# Patient Record
Sex: FEMALE | Race: WHITE | NOT HISPANIC OR LATINO | URBAN - METROPOLITAN AREA
[De-identification: names, ages, dates, MRNs, and addresses within clinical notes are randomized per-mention and may not be internally consistent; named-entity substitution may affect disease eponyms.]

---

## 2023-06-06 ENCOUNTER — EMERGENCY (EMERGENCY)
Facility: HOSPITAL | Age: 73
LOS: 1 days | Discharge: HOME | End: 2023-06-06
Attending: STUDENT IN AN ORGANIZED HEALTH CARE EDUCATION/TRAINING PROGRAM
Payer: MEDICARE

## 2023-06-06 VITALS
OXYGEN SATURATION: 96 % | TEMPERATURE: 98 F | HEART RATE: 80 BPM | SYSTOLIC BLOOD PRESSURE: 136 MMHG | DIASTOLIC BLOOD PRESSURE: 80 MMHG | RESPIRATION RATE: 16 BRPM | WEIGHT: 102.07 LBS | HEIGHT: 63 IN

## 2023-06-06 LAB
ALBUMIN SERPL ELPH-MCNC: 3.8 G/DL — SIGNIFICANT CHANGE UP (ref 3.3–5)
ALP SERPL-CCNC: 67 U/L — SIGNIFICANT CHANGE UP (ref 40–120)
ALT FLD-CCNC: 20 U/L — SIGNIFICANT CHANGE UP (ref 10–45)
ANION GAP SERPL CALC-SCNC: 11 MMOL/L — SIGNIFICANT CHANGE UP (ref 5–17)
APPEARANCE UR: ABNORMAL
AST SERPL-CCNC: 28 U/L — SIGNIFICANT CHANGE UP (ref 10–40)
BACTERIA # UR AUTO: NEGATIVE — SIGNIFICANT CHANGE UP
BASOPHILS # BLD AUTO: 0.03 K/UL — SIGNIFICANT CHANGE UP (ref 0–0.2)
BASOPHILS NFR BLD AUTO: 0.3 % — SIGNIFICANT CHANGE UP (ref 0–2)
BILIRUB SERPL-MCNC: 0.5 MG/DL — SIGNIFICANT CHANGE UP (ref 0.2–1.2)
BILIRUB UR-MCNC: NEGATIVE — SIGNIFICANT CHANGE UP
BUN SERPL-MCNC: 14 MG/DL — SIGNIFICANT CHANGE UP (ref 7–23)
CALCIUM SERPL-MCNC: 8.9 MG/DL — SIGNIFICANT CHANGE UP (ref 8.4–10.5)
CHLORIDE SERPL-SCNC: 101 MMOL/L — SIGNIFICANT CHANGE UP (ref 96–108)
CO2 SERPL-SCNC: 26 MMOL/L — SIGNIFICANT CHANGE UP (ref 22–31)
COLOR SPEC: YELLOW — SIGNIFICANT CHANGE UP
CREAT SERPL-MCNC: 0.52 MG/DL — SIGNIFICANT CHANGE UP (ref 0.5–1.3)
DIFF PNL FLD: ABNORMAL
EGFR: 99 ML/MIN/1.73M2 — SIGNIFICANT CHANGE UP
EOSINOPHIL # BLD AUTO: 0.02 K/UL — SIGNIFICANT CHANGE UP (ref 0–0.5)
EOSINOPHIL NFR BLD AUTO: 0.2 % — SIGNIFICANT CHANGE UP (ref 0–6)
EPI CELLS # UR: 1 /HPF — SIGNIFICANT CHANGE UP
ERYTHROCYTE [SEDIMENTATION RATE] IN BLOOD: 22 MM/HR — HIGH (ref 0–20)
GLUCOSE SERPL-MCNC: 95 MG/DL — SIGNIFICANT CHANGE UP (ref 70–99)
GLUCOSE UR QL: NEGATIVE — SIGNIFICANT CHANGE UP
HCT VFR BLD CALC: 41.6 % — SIGNIFICANT CHANGE UP (ref 34.5–45)
HGB BLD-MCNC: 13.9 G/DL — SIGNIFICANT CHANGE UP (ref 11.5–15.5)
HYALINE CASTS # UR AUTO: 6 /LPF — HIGH (ref 0–2)
IMM GRANULOCYTES NFR BLD AUTO: 0.6 % — SIGNIFICANT CHANGE UP (ref 0–0.9)
KETONES UR-MCNC: SIGNIFICANT CHANGE UP
LEUKOCYTE ESTERASE UR-ACNC: ABNORMAL
LYMPHOCYTES # BLD AUTO: 1.03 K/UL — SIGNIFICANT CHANGE UP (ref 1–3.3)
LYMPHOCYTES # BLD AUTO: 8.7 % — LOW (ref 13–44)
MCHC RBC-ENTMCNC: 29.6 PG — SIGNIFICANT CHANGE UP (ref 27–34)
MCHC RBC-ENTMCNC: 33.4 GM/DL — SIGNIFICANT CHANGE UP (ref 32–36)
MCV RBC AUTO: 88.7 FL — SIGNIFICANT CHANGE UP (ref 80–100)
MONOCYTES # BLD AUTO: 0.93 K/UL — HIGH (ref 0–0.9)
MONOCYTES NFR BLD AUTO: 7.9 % — SIGNIFICANT CHANGE UP (ref 2–14)
NEUTROPHILS # BLD AUTO: 9.71 K/UL — HIGH (ref 1.8–7.4)
NEUTROPHILS NFR BLD AUTO: 82.3 % — HIGH (ref 43–77)
NITRITE UR-MCNC: NEGATIVE — SIGNIFICANT CHANGE UP
NRBC # BLD: 0 /100 WBCS — SIGNIFICANT CHANGE UP (ref 0–0)
PH UR: 6 — SIGNIFICANT CHANGE UP (ref 5–8)
PLATELET # BLD AUTO: 324 K/UL — SIGNIFICANT CHANGE UP (ref 150–400)
POTASSIUM SERPL-MCNC: 3.9 MMOL/L — SIGNIFICANT CHANGE UP (ref 3.5–5.3)
POTASSIUM SERPL-SCNC: 3.9 MMOL/L — SIGNIFICANT CHANGE UP (ref 3.5–5.3)
PROT SERPL-MCNC: 7.3 G/DL — SIGNIFICANT CHANGE UP (ref 6–8.3)
PROT UR-MCNC: ABNORMAL
RAPID RVP RESULT: SIGNIFICANT CHANGE UP
RBC # BLD: 4.69 M/UL — SIGNIFICANT CHANGE UP (ref 3.8–5.2)
RBC # FLD: 12.7 % — SIGNIFICANT CHANGE UP (ref 10.3–14.5)
RBC CASTS # UR COMP ASSIST: 2 /HPF — SIGNIFICANT CHANGE UP (ref 0–4)
SARS-COV-2 RNA SPEC QL NAA+PROBE: SIGNIFICANT CHANGE UP
SODIUM SERPL-SCNC: 138 MMOL/L — SIGNIFICANT CHANGE UP (ref 135–145)
SP GR SPEC: 1.03 — HIGH (ref 1.01–1.02)
UROBILINOGEN FLD QL: ABNORMAL
WBC # BLD: 11.79 K/UL — HIGH (ref 3.8–10.5)
WBC # FLD AUTO: 11.79 K/UL — HIGH (ref 3.8–10.5)
WBC UR QL: 367 /HPF — HIGH (ref 0–5)

## 2023-06-06 PROCEDURE — 73110 X-RAY EXAM OF WRIST: CPT | Mod: 26,RT

## 2023-06-06 PROCEDURE — 99223 1ST HOSP IP/OBS HIGH 75: CPT

## 2023-06-06 PROCEDURE — 73201 CT UPPER EXTREMITY W/DYE: CPT | Mod: 26,RT,MA

## 2023-06-06 PROCEDURE — 71046 X-RAY EXAM CHEST 2 VIEWS: CPT | Mod: 26

## 2023-06-06 PROCEDURE — 73090 X-RAY EXAM OF FOREARM: CPT | Mod: 26,LT

## 2023-06-06 RX ORDER — IBUPROFEN 200 MG
600 TABLET ORAL ONCE
Refills: 0 | Status: COMPLETED | OUTPATIENT
Start: 2023-06-06 | End: 2023-06-06

## 2023-06-06 RX ORDER — CEFTRIAXONE 500 MG/1
2000 INJECTION, POWDER, FOR SOLUTION INTRAMUSCULAR; INTRAVENOUS ONCE
Refills: 0 | Status: COMPLETED | OUTPATIENT
Start: 2023-06-06 | End: 2023-06-06

## 2023-06-06 RX ORDER — VANCOMYCIN HCL 1 G
1000 VIAL (EA) INTRAVENOUS ONCE
Refills: 0 | Status: COMPLETED | OUTPATIENT
Start: 2023-06-06 | End: 2023-06-06

## 2023-06-06 RX ORDER — ACETAMINOPHEN 500 MG
975 TABLET ORAL ONCE
Refills: 0 | Status: COMPLETED | OUTPATIENT
Start: 2023-06-06 | End: 2023-06-06

## 2023-06-06 RX ORDER — SODIUM CHLORIDE 9 MG/ML
1000 INJECTION INTRAMUSCULAR; INTRAVENOUS; SUBCUTANEOUS ONCE
Refills: 0 | Status: COMPLETED | OUTPATIENT
Start: 2023-06-06 | End: 2023-06-06

## 2023-06-06 RX ORDER — VENLAFAXINE HCL 75 MG
50 CAPSULE, EXT RELEASE 24 HR ORAL DAILY
Refills: 0 | Status: DISCONTINUED | OUTPATIENT
Start: 2023-06-06 | End: 2023-06-09

## 2023-06-06 RX ADMIN — Medication 250 MILLIGRAM(S): at 15:35

## 2023-06-06 RX ADMIN — CEFTRIAXONE 100 MILLIGRAM(S): 500 INJECTION, POWDER, FOR SOLUTION INTRAMUSCULAR; INTRAVENOUS at 15:35

## 2023-06-06 RX ADMIN — Medication 600 MILLIGRAM(S): at 17:18

## 2023-06-06 RX ADMIN — Medication 975 MILLIGRAM(S): at 15:58

## 2023-06-06 RX ADMIN — Medication 600 MILLIGRAM(S): at 12:23

## 2023-06-06 RX ADMIN — Medication 975 MILLIGRAM(S): at 11:00

## 2023-06-06 RX ADMIN — SODIUM CHLORIDE 1000 MILLILITER(S): 9 INJECTION INTRAMUSCULAR; INTRAVENOUS; SUBCUTANEOUS at 11:39

## 2023-06-06 NOTE — ED CDU PROVIDER INITIAL DAY NOTE - OBJECTIVE STATEMENT
73 yo F with a PMH of OA p/w multiple complaints, states last week she started experiencing non prod cough/sore throat. Went to  and tested pos for strep. Was started on Amox which she has been taking and benzoate for cough. Tested negative on rapid COVID/flu. Over the last week pt endorsing decreased appetite intake and generalized weakness/malaise. Also c/o R wrist pain, swelling and redness. States she was at a concert over the weekend and was clapping a lot, thinks this may have triggered her arthritis. No reported fever/chills. Pt endorsing urinary freq as well for last few days. No other urinary complaints. Denies nausea, vomiting, congestion/runny nose, chest pain, SOB, abd pain, diarrhea, headache, dizziness.    In ED wbc 11.79K. [id number]. UA +for infection; Xray wrist noted osteoarthritic changes (questioned scaphoid fracture though per ED team not concerned regarding this, no tenderness over area and nontraumatic). Xray forearm negative for fracture. ED team initially concerned for septic joint so tap was performed w/ scant return, sample sent to lab. Ortho consulted, pt sent to CDU for monitoring of symptoms, f/u tap results, and ortho consult.

## 2023-06-06 NOTE — ED PROVIDER NOTE - CROS ED CONS ALL NEG
Operative Report    Date: 11/10/2017    Surgeon: John Ganser M.D.    Assistant: Luis Eduardo Sihne PA-C    Anesthesia: Dr. Gansert    Preoperative Diagnosis: Morbid Obesity, Hypertension    Postoperative Diagnosis: Same, Hiatal Hernia    Procedure Performed: Laparoscopic Vertical Sleeve Gastrectomy, Hiatal hernia repair    Indications: The patient is suffering from morbid obesity and it's sequelae with a body mass index of 37 kg/m2. They have failed dietary attempts at weight loss and have been fully counseled about risks and alternatives to sleeve gastrectomy and wish to proceed.    Findings: Sliding hiatal hernia    DESCRIPTION OF PROCEDURE: The patient was identified and general anesthetic   administered. Her abdomen was prepped and draped in the usual sterile   fashion. Local anesthesia of 0.5% Marcaine with epinephrine was injected   prior to making skin incision. Small incision was made to left midline in low  epigastric region and the Veress needle passed. The abdomen was insufflated   with carbon dioxide without incident and a 5-mm blunt trocar and 5-mm   30-degree scope inserted. Carmela liver retractor was passed through a   small subxiphoid incision, used to elevate the lateral segment of liver and   this was held with the robotic arm. Right upper quadrant 12 mm, left upper   quadrant 15 mm, left lateral subcostal 5 mm trocars were placed. Inspection   of the hiatus revealed a sliding hiatal hernia. The fat pad was rotated off the gastroesophageal junction to help expose this area as well. The omentum was then   taken down off the greater curve of the stomach using the Harmonic scalpel.    The hiatal hernia was reduced and the hiatal hernia repaired with 0-Ethibond anteriorly.    A 40-Maltese bougie wasthen passed by anesthesia and laid along the lesser curve of the stomach. The [place] 60 power stapler was then placed starting about 4-5 cm proximal to the pylorus and positioned adjacent to the bougie and  negative... fired. The sleeve was   completed with sequential firing of the stapler using green and gold loads. A  small cuff of stomach was left adjacent to the esophagus. The distal stomach was removed through the 15 mm trocar site.    The staple line was then oversewn with 3-0 Vicryl suture   incorporating the omentum along the way. The bougie was removed and the   sleeve maintained good orientation with no torsion or kinks. The abdomen was   irrigated and hemostasis assured. The trocars were removed. The abdomen   deflated, and incisions closed with Vicryl. Sterile dressings were applied.   The patient returned to recovery room in stable condition.    ____________________________________  JOHN H. GANSER, MD John Ganser, M.D., F.A.C.S.  [facility]  [facility] [place]  [phone]

## 2023-06-06 NOTE — ED PROVIDER NOTE - ATTENDING APP SHARED VISIT CONTRIBUTION OF CARE
I, Jorge Luis Duong, performed a history and physical exam of the patient and discussed their management with the resident and/or advanced care provider. I reviewed the resident and/or advanced care provider's note and agree with the documented findings and plan of care. I was present and available for all procedures.    71 yo F with a PMH of OA p/w multiple complaints, states last week she started experiencing non prod cough/sore throat. Went to  and tested pos for strep. Was started on Amox which she has been taking and benzoate for cough. Tested negative on rapid COVID/flu. Over the last week pt endorsing decreased appetite intake and generalized weakness/malaise. Also c/o R wrist pain, swelling and redness. States she was at a concert over the weekend and was clapping a lot, thinks this may have triggered her arthritis. No reported fever/chills. Pt endorsing urinary freq as well for last few days. No other urinary complaints. Denies nausea, vomiting, congestion/runny nose, chest pain, SOB, abd pain, diarrhea, headache, dizziness. denies purposeless movements, denies rashes, denies Subcutaneous nodules    Well appearing and in NAD, head normal appearing atraumatic, trachea midline, no respiratory distress, lungs cta bilaterally, rrr no murmurs, soft NT ND abdomen, Right upper extremity mildly limited range of motion but overall able to range wrist slightly with some mild tenderness palpation at the distal forearm region in the proximal hand some red streaking underlying the dorsal aspect of the wrist without diffuse erythema or induration or warmth of the wrist, Neurovascular intact distally otherwise no visible extremity deformities , Alert and oriented, non focal neuro exam, skin warm and dry, normal affect and mood, no leg swelling, calf ttp or jvd    Concerning for arthritis likely inflammatory in nature consider reactive poststreptococcal arthritis in the setting of recent strep diagnosis and no other major joints criteria for rheumatic fever otherwise nonmigratory single joint no other rashes or nodules visualize unlikely septic arthritis possibly related to trauma will evaluate with x-ray rule out bony injury but no specific trauma patient does endorse clapping a lot on Saturday but otherwise woke up with pain on Sunday so may be overuse did have a fall 3 weeks ago sustained right wrist trauma but otherwise no evaluation for the fall discussed with patient extensively and daughter at bedside screening blood work inflammatory markers x-ray pain medication anticipate discharge with strict return precautions patient agreeable with plan

## 2023-06-06 NOTE — ED CDU PROVIDER INITIAL DAY NOTE - ATTENDING APP SHARED VISIT CONTRIBUTION OF CARE
I, Jorge Luis Duong, performed a history and physical exam of the patient and discussed their management with the resident and/or advanced care provider. I reviewed the resident and/or advanced care provider's note and agree with the documented findings and plan of care. I was present and available for all procedures.    73 yo F with a PMH of OA p/w multiple complaints, states last week she started experiencing non prod cough/sore throat. Went to  and tested pos for strep. Was started on Amox which she has been taking and benzoate for cough. Tested negative on rapid COVID/flu. Over the last week pt endorsing decreased appetite intake and generalized weakness/malaise. Also c/o R wrist pain, swelling and redness. States she was at a concert over the weekend and was clapping a lot, thinks this may have triggered her arthritis. No reported fever/chills. Pt endorsing urinary freq as well for last few days. No other urinary complaints. Denies nausea, vomiting, congestion/runny nose, chest pain, SOB, abd pain, diarrhea, headache, dizziness.    In ED wbc 11.79K. [id number]. UA +for infection; Xray wrist noted osteoarthritic changes (questioned scaphoid fracture though per ED team not concerned regarding this, no tenderness over area and nontraumatic). Xray forearm negative for fracture. ED team initially concerned for septic joint so tap was performed w/ scant return, sample sent to lab. Ortho consulted, pt sent to CDU for monitoring of symptoms, f/u tap results, and ortho consult.

## 2023-06-06 NOTE — ED CDU PROVIDER DISPOSITION NOTE - CLINICAL COURSE
73 yo F with a PMH of OA p/w multiple complaints, states last week she started experiencing non prod cough/sore throat. Went to  and tested pos for strep. Was started on Amox which she has been taking and benzoate for cough. Tested negative on rapid COVID/flu. Over the last week pt endorsing decreased appetite intake and generalized weakness/malaise. Also c/o R wrist pain, swelling and redness. States she was at a concert over the weekend and was clapping a lot, thinks this may have triggered her arthritis. No reported fever/chills. Pt endorsing urinary freq as well for last few days. No other urinary complaints. Denies nausea, vomiting, congestion/runny nose, chest pain, SOB, abd pain, diarrhea, headache, dizziness.    	In ED wbc 11.79K. [id number]. UA +for infection; Xray wrist noted osteoarthritic changes (questioned scaphoid fracture though per ED team not concerned regarding this, no tenderness over area and nontraumatic). Xray forearm negative for fracture. ED team initially concerned for septic joint so tap was performed w/ scant return, sample sent to lab. Ortho consulted, pt sent to CDU for monitoring of symptoms, f/u tap results, and ortho consult.  In CDU, patient was evaluated by Ortho who recommended CT right wrist with contrast and reassess. 71 yo F with a PMH of OA p/w multiple complaints, states last week she started experiencing non prod cough/sore throat. Went to  and tested pos for strep. Was started on Amox which she has been taking and benzoate for cough. Tested negative on rapid COVID/flu. Over the last week pt endorsing decreased appetite intake and generalized weakness/malaise. Also c/o R wrist pain, swelling and redness. States she was at a concert over the weekend and was clapping a lot, thinks this may have triggered her arthritis. No reported fever/chills. Pt endorsing urinary freq as well for last few days. No other urinary complaints. Denies nausea, vomiting, congestion/runny nose, chest pain, SOB, abd pain, diarrhea, headache, dizziness.    	In ED wbc 11.79K. [id number]. UA +for infection; Xray wrist noted osteoarthritic changes (questioned scaphoid fracture though per ED team not concerned regarding this, no tenderness over area and nontraumatic). Xray forearm negative for fracture. ED team initially concerned for septic joint so tap was performed w/ scant return, sample sent to lab. Ortho consulted, pt sent to CDU for monitoring of symptoms, f/u tap results, and ortho consult.  In CDU, patient was evaluated by Ortho who recommended CT right wrist with contrast and reassess.  non-specific wrist effusion and multiple ganglion cysts on imaging. pt receiving IV abx with improvement. seen by ortho hand for reeval in morning- ok to f/up outpatient, goes home on oral abx

## 2023-06-06 NOTE — ED ADULT NURSE NOTE - OBJECTIVE STATEMENT
1029 pt 72yf aox4 c/o weakness fatigue, dx strep on antibx x 1wk, this Sunday noted swelling of rt hand poss arthritis, was seen only on urgent care for her strep throat, w/ family able to verbalize concerns, pending eval

## 2023-06-06 NOTE — ED CDU PROVIDER INITIAL DAY NOTE - SKIN, MLM
Skin normal color for race, warm, dry and intact. No evidence of rash. +erythema to R wrist as above.

## 2023-06-06 NOTE — ED CDU PROVIDER DISPOSITION NOTE - PATIENT PORTAL LINK FT
You can access the FollowMyHealth Patient Portal offered by [facility] by registering at the following website: [email or web address]. By joining [facility]’s FollowMyHealth portal, you will also be able to view your health information using other applications (apps) compatible with our system.

## 2023-06-06 NOTE — ED CDU PROVIDER DISPOSITION NOTE - CARE PROVIDER_API CALL
Smita Obando  Orthopaedic Surgery  [address], [address]  [place] [address]  Phone: [phone]  Fax: [phone]  Follow Up Time:

## 2023-06-06 NOTE — ED CDU PROVIDER DISPOSITION NOTE - NSFOLLOWUPINSTRUCTIONS_ED_ALL_ED_FT
1) Follow-up with your Primary Medical Doctor or referred doctor. Call today / next business day for prompt follow-up.  2) Return to Emergency room for any worsening or persistent pain, weakness, fever, or any other concerning symptoms.  3) See attached instruction sheets for additional information, including information regarding signs and symptoms to look out for, reasons to seek immediate care and other important instructions.  4) Follow-up with any specialists as discussed / noted as well. 1. Stay hydrated. Elevate extremity with overlying infection.  2. Continue current home medications  3. Take Keflex 500mg 4x/day and Doxycycline 100mg 2x/day for 10 days. Avoid direct sunlight while on this medication. Start probiotic as instructed.  4. Follow up with your PCP in 1-2 days. Follow up with Hand Specialist Dr. Obando within 3-4 days.   Smita Obando  Orthopaedic Surgery  [address] [place] [address], [address]  [place] [address]  Phone: [phone]  Fax: [phone]       (Bring Printed results to your doctor visit)  5. Return if symptoms worsen, fever, weakness, spreading redness and all other concerns      Cellulitis    WHAT YOU NEED TO KNOW:    What is cellulitis? Cellulitis is a skin infection caused by bacteria. Cellulitis is common and can become severe. Cellulitis usually appears on the lower legs. It can also appear on the arms, face, and other areas. Cellulitis develops when bacteria enter a crack or break in your skin, such as a scratch, bite, or cut.  Cellulitis     What are the signs and symptoms of cellulitis? Signs and symptoms usually appear on one side of your body. You may have any of the following:    A fever    A red, warm, swollen area on your skin    Pain when the area is touched    Red spots, bumps, or blisters    Bumpy, raised skin that feels like an orange peel  How is cellulitis diagnosed? Your healthcare provider may know you have cellulitis by looking at your skin. You may need blood tests to show what kind of bacteria are causing your infection. Other tests may be needed to see how much the infection has spread.    How is cellulitis treated? You should start to see improvement in 3 days. If your cellulitis is severe, you may need IV antibiotics in the hospital. If cellulitis is not treated, the infection can spread through your body and become life-threatening. You may need any of the following medicines:    Antibiotics help treat a bacterial infection.    Acetaminophen decreases pain and fever. It is available without a doctor's order. Ask how much to take and how often to take it. Follow directions. Read the labels of all other medicines you are using to see if they also contain acetaminophen, or ask your doctor or pharmacist. Acetaminophen can cause liver damage if not taken correctly.    NSAIDs, such as ibuprofen, help decrease swelling, pain, and fever. This medicine is available with or without a doctor's order. NSAIDs can cause stomach bleeding or kidney problems in certain people. If you take blood thinner medicine, always ask your healthcare provider if NSAIDs are safe for you. Always read the medicine label and follow directions.  How can I manage my symptoms?    Wash the area with soap and water every day. Gently pat dry. Use bandages if directed by your healthcare provider.    Apply cream or ointment as directed. These help protect the area. Most over-the-counter products, such as petroleum jelly, are good to use. Ask your healthcare provider about specific creams or ointments you should use.    Place a cool, damp cloth on the area. Use clean cloths and clean water. You can do this as often as you need to. Cool, damp cloths may help decrease pain.    Elevate the area above the level of your heart as often as you can. This will help decrease swelling and pain. Prop the area on pillows or blankets to keep it elevated comfortably.  Elevate Leg  How can I help prevent cellulitis?    Do not scratch bug bites or areas of injury. You increase your risk for cellulitis by scratching these areas.    Do not share personal items, such as towels, clothing, and razors.    Clean exercise equipment with germ-killing  before and after you use it.    Treat athlete’s foot. This can help prevent the spread of a bacterial skin infection.    Wash your hands often. Use soap and water. Wash your hands after you use the bathroom, change a child's diapers, or sneeze. Wash your hands before you prepare or eat food. Use lotion to prevent dry, cracked skin.  Handwashing  When should I seek immediate care?    Your wound gets larger and more painful.    You feel a crackling under your skin when you touch it.    You have purple dots or bumps on your skin, or you see bleeding under your skin.    You see red streaks coming from the infected area.  When should I call my doctor?    The red, warm, swollen area gets larger.    Your fever or pain does not go away or gets worse.    The area does not get smaller after 3 days of antibiotics.    You have questions or concerns about your condition or care.  CARE AGREEMENT:    You have the right to help plan your care. Learn about your health condition and how it may be treated. Discuss treatment options with your healthcare providers to decide what care you want to receive. You always have the right to refuse treatment.    © [employer] 1973, 2023    	  back to top            © [employer] 1973, 2023

## 2023-06-06 NOTE — ED CDU PROVIDER INITIAL DAY NOTE - UPPER EXTREMITY EXAM, RIGHT
Diffuse swelling to R wrist. +faint erythema from ventral aspect of wrist extending to mid forearm. able to range hand at wrist though with pain. Compartments soft/compressible. Sensation intact.

## 2023-06-06 NOTE — ED PROVIDER NOTE - PHYSICAL EXAMINATION
CONSTITUTIONAL: Well appearing and in no apparent distress.  ENT: Airway patent, moist mucous membranes.   EYES: Pupils equal, round and reactive to light. EOMI. Conjunctiva normal appearing.   CARDIAC: Normal rate, regular rhythm.  Heart sounds S1, S2.    RESPIRATORY: Breath sounds clear and equal bilaterally.   GASTROINTESTINAL: Abdomen soft, non-tender, not distended.  MUSCULOSKELETAL: Spine appears normal.  +R wrist swelling and redness with mild streaking up forearm. Decreased ROM 2/2 pain. Wrist warm to touch. Radial pulse palpable. LUE WNL.   NEUROLOGICAL: Alert and oriented x3, no focal deficits, no motor or sensory deficits. 5/5 muscle strength throughout.  SKIN: Skin normal color, warm, dry and intact.   PSYCHIATRIC: Normal mood and affect.

## 2023-06-06 NOTE — ED PROVIDER NOTE - OBJECTIVE STATEMENT
71 yo F with a PMH of OA p/w multiple complaints, states last week she started experiencing non prod cough/sore throat. Went to  and tested pos for strep. Was started on Amox which she has been taking and benzonate for cough. Tested negative on rapid COVID/flu. Over the last week pt endorsing decreased appteite/PO intake and generalized weakness/malaise. Also c/o R wrist pain, swelling and redness. States she was at a concert over the weekend and was clapping a lot, thinks this may have triggered her arthritis. No reported fever/chills. Pt endorsing urinary freq as well for last few days. No other urinary complaints. Denies nausea, vomiting, congestion/runny nose, chest pain, SOB, abd pain, diarrhea, headache, dizziness. 73 yo F with a PMH of OA p/w multiple complaints, states last week she started experiencing non prod cough/sore throat. Went to  and tested pos for strep. Was started on Amox which she has been taking and benzoate for cough. Tested negative on rapid COVID/flu. Over the last week pt endorsing decreased appetite intake and generalized weakness/malaise. Also c/o R wrist pain, swelling and redness. States she was at a concert over the weekend and was clapping a lot, thinks this may have triggered her arthritis. No reported fever/chills. Pt endorsing urinary freq as well for last few days. No other urinary complaints. Denies nausea, vomiting, congestion/runny nose, chest pain, SOB, abd pain, diarrhea, headache, dizziness.

## 2023-06-06 NOTE — ED CDU PROVIDER DISPOSITION NOTE - ATTENDING APP SHARED VISIT CONTRIBUTION OF CARE
Garland Watkins MD:   I personally saw the patient and performed a substantive portion of the visit including all aspects of the medical decision making.    Summary: 72-year-old female with history of OA, who presents with sore throat, nonproductive cough, right wrist pain/swelling/redness.    In the ED, labs obtained that showed WBC of 11.79 with elevated ESR and CRP, abnormal urinalysis, x-ray that showed osteoarthritic changes and cortical irregularity of scaphoid, CT that showed nonspecific wrist joint effusion sent around the radiocarpal interval and DRUJ, and multiple ganglion cysts, ultrasound-guided aspiration was performed.    Patient was placed in the CDU for further monitoring, frequent reassessments, Q4 hour vital signs, IV antibiotics, pain control, orthopedics, following synovial fluid analysis.    Patient was evaluated by me in the morning, states she has improved symptoms, has not had any fevers, area of demarcation has regressed, there is mildly decreased range of motion of the right wrist as compared to the other side, neurovascular intact distally.    Stable for discharge with close follow-up and strict return precautions. Discussed the indications and side-effects of applicable medications. The patient has been informed of all concerning signs and symptoms to return to Emergency Department, the necessity to follow up with PMD within 2-3 days and hand/Ortho in 2 to 3 days was explained, or to return to the ED if unable to follow-up appropriately, and the patient reports understanding of above with capacity and insight.

## 2023-06-06 NOTE — ED CDU PROVIDER INITIAL DAY NOTE - PROGRESS NOTE DETAILS
None known CDU PROGRESS NOTE ILENE LANDERS: Received pt at 1900 sign-out. Pt resting in stretcher in NAD. Case/plan reviewed. VSS. On exam, +erythema of the volar/dorsal wrist and proximal forearm without TTP. Full ROM, Pain only at terminal flexion/extension. no snuffbox TTP. Pt declines analgesia now. Ortho recs appreciated, will f/u CR right wrist with contrast and reassess. CDU PROGRESS NOTE ILENE LANDERS: Received pt at 1900 sign-out. Pt resting in stretcher in NAD. Case/plan reviewed. VSS. On exam, +erythema of the volar/dorsal wrist and proximal forearm without TTP. Full ROM, Pain only at terminal flexion/extension. no snuffbox TTP. Pt declines analgesia now. Ortho recs appreciated, will f/u CT right wrist with contrast and reassess.

## 2023-06-06 NOTE — ED PROVIDER NOTE - PROGRESS NOTE DETAILS
Ortho paged  Dillan Garcia PA-C Ortho aware, will come ne Garcia PA-C Xray R hand results reviewed with Dr. Duong and with pt   Pt states she did sustain mechanical fall apporx 3 weeks ago and fell on to that hand.  States most of the trauma she sustained was on the lateral aspect of her hand. Did not have any pain after. Pt has no snuff box TTP at present. Pending full ortho recs after eval. Dillan Garcia PA-C

## 2023-06-06 NOTE — CONSULT NOTE ADULT - ASSESSMENT
ASSESSMENT & PLAN:  72y Female with Right wrist swelling, erythema sp streptococcus infection. Ortho consulted for concern for septic arthritis sp ED aspiration.    - Please obtain CR right wrist with constrast  -Pain control as needed  -DVT ppx  -WBAT  RUE  -Discussed with attending surgeon    Orthopaedic Surgery  [facility] [place] [id number]  J        [id number]  [facility]  [id number]/ [phone]

## 2023-06-06 NOTE — CONSULT NOTE ADULT - SUBJECTIVE AND OBJECTIVE BOX
72y Female presents 71 yo F with a PMH of OAc/o R wrist pain, swelling and rednesStates she was at a concert over the weekend and was clapping a lot, thinks this may have triggered her arthritis. No reported fever/chills. last week she started experiencing non prod cough/sore throat. Went to  and tested pos for strep. Was started on Amox which she has been taking and benzoate for cough. No other orthopaedic complaints    PAST MEDICAL & SURGICAL HISTORY:  Strep throat      Arthritis      No significant past surgical history        Home Medications:    Allergies    No Known Allergies    Intolerances                            13.9   11.79 )-----------( 324      ( 2023 11:25 )             41.6         138  |  101  |  14  ----------------------------<  95  3.9   |  26  |  0.52    Ca    8.9      2023 11:25    TPro  7.3  /  Alb  3.8  /  TBili  0.5  /  DBili  x   /  AST  28  /  ALT  20  /  AlkPhos  67  -      Urinalysis Basic - ( 2023 11:52 )    Color: Yellow / Appearance: Slightly Turbid / S.027 / pH: x  Gluc: x / Ketone: Trace  / Bili: Negative / Urobili: 4 mg/dL   Blood: x / Protein: 100 mg/dl / Nitrite: Negative   Leuk Esterase: Large / RBC: 2 /hpf /  /HPF   Sq Epi: x / Non Sq Epi: x / Bacteria: Negative        VITALS  Vital Signs Last 24 Hrs  T(C): 36.8 (2023 19:05), Max: 36.8 (2023 19:05)  T(F): 98.2 (2023 19:05), Max: 98.2 (2023 19:05)  HR: 72 (2023 19:05) (72 - 80)  BP: 106/60 (2023 19:05) (106/60 - 136/80)  BP(mean): --  RR: 18 (2023 19:05) (16 - 18)  SpO2: 99% (2023 19:05) (96% - 99%)    Parameters below as of 2023 19:05  Patient On (Oxygen Delivery Method): room air        PHYSICAL EXAM  General: NAD, Awake and Alert, resting comfortably  Resp: Non-labored breathing, No accessory muscle use  RUE  Erythema of the volar/dorsal wrist and proximal forearm without TTP  pROM wrist : 0-15 extension, 0-5 flexion  Pain only at terminal flexion/extension  no snuffbox TTP  AIN/PIN/U  SILT  [facility]    IMAGING:    ACC: [id number] EXAM:  XR WRIST COMP MIN 3 VIEWS RT   ORDERED BY:  KEMI RAM     PROCEDURE DATE:  2023      INTERPRETATION:  HISTORY: R wrist redness/swelling    TECHNIQUE: 4 views of the right wrist.    COMPARISON: None    IMPRESSION:    Cortical irregularity along the scaphoid waist, which may represent an   acute nondisplaced fracture or be related to a normal anatomic variant.   Correlation with snuffbox tenderness is recommended.    Osteoarthritic changes throughout the wrist, worst at the basal thumb.   Chondrocalcinosis in the region of the TFCC. No dislocation.    --- End of Report ---    JONY ROWE M.D., ATTENDING RADIOLOGIST  This document has been electronically signed. 2023 11:15AM      < from: Xray Forearm, Right (23 @ 16:20) >  ACC: [id number] EXAM:  XR FOREARM 2 VIEWS RT   ORDERED BY:  KEMI RAM     PROCEDURE DATE:  2023          INTERPRETATION:  HISTORY: wrist pain/redness/swelling    TECHNIQUE: 2 views of the right forearm    COMPARISON: None    IMPRESSION:    No acute displaced fracture within the forearm. No definite cortical   erosion or periostitis to suggest osteomyelitis within the forearm.    Osteoarthritic changes within the wrist with areas of chondrocalcinosis   within the region of the TFCC.    --- End of Report ---            JONY ROWE M.D., ATTENDING RADIOLOGIST  This document has been electronically signed. 2023  4:26PM    < end of copied text >

## 2023-06-06 NOTE — ED CDU PROVIDER DISPOSITION NOTE - CARE PLAN
Airway  Urgency: elective    Airway not difficult    General Information and Staff    Patient location during procedure: OR  CRNA: Bee Hernandez CRNA    Indications and Patient Condition  Indications for airway management: airway protection    Preoxygenated: yes  MILS maintained throughout  Mask difficulty assessment: 1 - vent by mask    Final Airway Details  Final airway type: endotracheal airway      Successful airway: ETT  Cuffed: yes   Successful intubation technique: direct laryngoscopy  Facilitating devices/methods: intubating stylet  Endotracheal tube insertion site: oral  Blade: Itzel  Blade size: 3  ETT size (mm): 7.0  Cormack-Lehane Classification: grade I - full view of glottis  Placement verified by: chest auscultation and capnometry   Measured from: lips  ETT to lips (cm): 19  Number of attempts at approach: 1               1

## 2023-06-07 VITALS
SYSTOLIC BLOOD PRESSURE: 124 MMHG | TEMPERATURE: 98 F | RESPIRATION RATE: 16 BRPM | DIASTOLIC BLOOD PRESSURE: 79 MMHG | OXYGEN SATURATION: 98 % | HEART RATE: 75 BPM

## 2023-06-07 LAB
BASOPHILS # BLD AUTO: 0.04 K/UL — SIGNIFICANT CHANGE UP (ref 0–0.2)
BASOPHILS NFR BLD AUTO: 0.6 % — SIGNIFICANT CHANGE UP (ref 0–2)
EOSINOPHIL # BLD AUTO: 0.12 K/UL — SIGNIFICANT CHANGE UP (ref 0–0.5)
EOSINOPHIL NFR BLD AUTO: 1.9 % — SIGNIFICANT CHANGE UP (ref 0–6)
HCT VFR BLD CALC: 36.8 % — SIGNIFICANT CHANGE UP (ref 34.5–45)
HGB BLD-MCNC: 12.1 G/DL — SIGNIFICANT CHANGE UP (ref 11.5–15.5)
IMM GRANULOCYTES NFR BLD AUTO: 0.5 % — SIGNIFICANT CHANGE UP (ref 0–0.9)
LYMPHOCYTES # BLD AUTO: 1.3 K/UL — SIGNIFICANT CHANGE UP (ref 1–3.3)
LYMPHOCYTES # BLD AUTO: 20.6 % — SIGNIFICANT CHANGE UP (ref 13–44)
MCHC RBC-ENTMCNC: 29.5 PG — SIGNIFICANT CHANGE UP (ref 27–34)
MCHC RBC-ENTMCNC: 32.9 GM/DL — SIGNIFICANT CHANGE UP (ref 32–36)
MCV RBC AUTO: 89.8 FL — SIGNIFICANT CHANGE UP (ref 80–100)
MONOCYTES # BLD AUTO: 0.67 K/UL — SIGNIFICANT CHANGE UP (ref 0–0.9)
MONOCYTES NFR BLD AUTO: 10.6 % — SIGNIFICANT CHANGE UP (ref 2–14)
NEUTROPHILS # BLD AUTO: 4.16 K/UL — SIGNIFICANT CHANGE UP (ref 1.8–7.4)
NEUTROPHILS NFR BLD AUTO: 65.8 % — SIGNIFICANT CHANGE UP (ref 43–77)
NRBC # BLD: 0 /100 WBCS — SIGNIFICANT CHANGE UP (ref 0–0)
PLATELET # BLD AUTO: 276 K/UL — SIGNIFICANT CHANGE UP (ref 150–400)
RBC # BLD: 4.1 M/UL — SIGNIFICANT CHANGE UP (ref 3.8–5.2)
RBC # FLD: 12.8 % — SIGNIFICANT CHANGE UP (ref 10.3–14.5)
WBC # BLD: 6.32 K/UL — SIGNIFICANT CHANGE UP (ref 3.8–10.5)
WBC # FLD AUTO: 6.32 K/UL — SIGNIFICANT CHANGE UP (ref 3.8–10.5)

## 2023-06-07 PROCEDURE — 87086 URINE CULTURE/COLONY COUNT: CPT

## 2023-06-07 PROCEDURE — 81001 URINALYSIS AUTO W/SCOPE: CPT

## 2023-06-07 PROCEDURE — 87070 CULTURE OTHR SPECIMN AEROBIC: CPT

## 2023-06-07 PROCEDURE — 73090 X-RAY EXAM OF FOREARM: CPT

## 2023-06-07 PROCEDURE — 20604 DRAIN/INJ JOINT/BURSA W/US: CPT

## 2023-06-07 PROCEDURE — 85025 COMPLETE CBC W/AUTO DIFF WBC: CPT

## 2023-06-07 PROCEDURE — 86140 C-REACTIVE PROTEIN: CPT

## 2023-06-07 PROCEDURE — 71046 X-RAY EXAM CHEST 2 VIEWS: CPT

## 2023-06-07 PROCEDURE — 96375 TX/PRO/DX INJ NEW DRUG ADDON: CPT | Mod: XU

## 2023-06-07 PROCEDURE — 87077 CULTURE AEROBIC IDENTIFY: CPT

## 2023-06-07 PROCEDURE — 20604 DRAIN/INJ JOINT/BURSA W/US: CPT | Mod: RT

## 2023-06-07 PROCEDURE — 73110 X-RAY EXAM OF WRIST: CPT

## 2023-06-07 PROCEDURE — 73201 CT UPPER EXTREMITY W/DYE: CPT | Mod: MA

## 2023-06-07 PROCEDURE — 96376 TX/PRO/DX INJ SAME DRUG ADON: CPT | Mod: XU

## 2023-06-07 PROCEDURE — 99238 HOSP IP/OBS DSCHRG MGMT 30/<: CPT

## 2023-06-07 PROCEDURE — 96374 THER/PROPH/DIAG INJ IV PUSH: CPT | Mod: XU

## 2023-06-07 PROCEDURE — 80053 COMPREHEN METABOLIC PANEL: CPT

## 2023-06-07 PROCEDURE — 87075 CULTR BACTERIA EXCEPT BLOOD: CPT

## 2023-06-07 PROCEDURE — 99284 EMERGENCY DEPT VISIT MOD MDM: CPT | Mod: 25

## 2023-06-07 PROCEDURE — G0378: CPT

## 2023-06-07 PROCEDURE — 20612 ASPIRATE/INJ GANGLION CYST: CPT | Mod: RT

## 2023-06-07 PROCEDURE — 87186 SC STD MICRODIL/AGAR DIL: CPT

## 2023-06-07 PROCEDURE — 87205 SMEAR GRAM STAIN: CPT

## 2023-06-07 PROCEDURE — 0225U NFCT DS DNA&RNA 21 SARSCOV2: CPT

## 2023-06-07 PROCEDURE — 85652 RBC SED RATE AUTOMATED: CPT

## 2023-06-07 RX ORDER — CEFTRIAXONE 500 MG/1
1000 INJECTION, POWDER, FOR SOLUTION INTRAMUSCULAR; INTRAVENOUS EVERY 12 HOURS
Refills: 0 | Status: DISCONTINUED | OUTPATIENT
Start: 2023-06-07 | End: 2023-06-09

## 2023-06-07 RX ORDER — CEPHALEXIN 500 MG
1 CAPSULE ORAL
Qty: 40 | Refills: 0
Start: 2023-06-07 | End: 2023-06-16

## 2023-06-07 RX ADMIN — CEFTRIAXONE 100 MILLIGRAM(S): 500 INJECTION, POWDER, FOR SOLUTION INTRAMUSCULAR; INTRAVENOUS at 03:34

## 2023-06-07 NOTE — ED CDU PROVIDER SUBSEQUENT DAY NOTE - HISTORY
CDU PROGRESS NOTE PA LEESA: Pt resting in stretcher in NAD. VSS, afebrile. CT right wrist with contrast completed, showing Nonspecific wrist joint effusion, centered in the radiocarpal interval   and DRUJ. Multiple ganglion cyst. On exam, + faint erythema of the volar/dorsal wrist and proximal forearm without TTP. Full ROM, Pain only at terminal flexion/extension. no snuffbox TTP. Pt declines analgesia now. Will continue IV abx and monitor. CDU PROGRESS NOTE PA LEESA: Pt resting in stretcher in NAD. VSS, afebrile. CT right wrist with contrast completed, showing Nonspecific wrist joint effusion, centered in the radiocarpal interval and DRUJ. Multiple ganglion cyst. On exam, + faint erythema of the volar/dorsal wrist and proximal forearm without TTP. Full ROM, Pain only at terminal flexion/extension. no snuffbox TTP. Pt declines analgesia now. Will continue IV abx and monitor.

## 2023-06-07 NOTE — ED ADULT NURSE REASSESSMENT NOTE - NS ED NURSE REASSESS COMMENT FT1
Pt received from ABY Cowan. Pt oriented to CDU & plan of care was discussed. Pt A&O x 4. Pt in CDU for  . Pt denies any pain at this time. Patient verbalized unable to move wrist freely. Ice pack provided. V/S stable, pt afebrile,  IV in place, patent and free of signs of infiltration. Pt resting in bed. Safety & comfort measures maintained. Call bell in reach. Will continue to monitor. CT pending.
Pt received from ABY Ortiz. Pt oriented to CDU & plan of care was discussed. Pt A&O x 4. Pt in CDU for IV abx, pain control, ortho to see, F/u tap results. Pt denies any chills, fever, wrist pain. Pt has redness and swelling, to right wrist. V/S stable, pt afebrile,  IV in place, patent and free of signs of infiltration. Pt resting in bed. Safety & comfort measures maintained. Call bell in reach. Will continue to monitor.

## 2023-06-07 NOTE — ED CDU PROVIDER SUBSEQUENT DAY NOTE - UPPER EXTREMITY EXAM, RIGHT
mild swelling to R wrist. +faint erythema from ventral aspect of wrist extending to mid forearm. able to range hand at wrist though with pain. Compartments soft/compressible. Sensation intact.

## 2023-06-07 NOTE — PROGRESS NOTE ADULT - ASSESSMENT
72y Female with Right wrist swelling, erythema sp streptococcus infection. Ortho consulted for concern for septic arthritis sp ED aspiration with negative gram stain. Patient' s exam is improving. In addition to her painless wrist ROM, there is low concern for septic arthritis at this time     - NO acute orthopaedic surgical intervention  -Pain control as needed  -WBAT  RUE  -Stable for discharge on PO antibiotics x 10-14 days  -Discussed with attending surgeon    Orthopaedic Surgery  [facility] [id number]  [facility]        [id number]  [facility]  [id number]/ [phone]

## 2023-06-07 NOTE — PROGRESS NOTE ADULT - SUBJECTIVE AND OBJECTIVE BOX
SUBJECTIVE:   Patient seen and examined at bedside. Pt doing generally well.    Pas no pain just some stiffness with her wrist. got her antibiotics at 3 am    OBJECTIVE:  Vital Signs Last 24 Hrs  T(C): 37 (07 Jun 2023 07:33), Max: 37 (07 Jun 2023 07:33)  T(F): 98.6 (07 Jun 2023 07:33), Max: 98.6 (07 Jun 2023 07:33)  HR: 90 (07 Jun 2023 07:33) (68 - 90)  BP: 111/73 (07 Jun 2023 07:33) (104/67 - 136/80)  BP(mean): --  RR: 16 (07 Jun 2023 07:33) (16 - 18)  SpO2: 98% (07 Jun 2023 07:33) (96% - 100%)    Parameters below as of 07 Jun 2023 07:33  Patient On (Oxygen Delivery Method): room air        General: NAD  Resp: Non-labored breathing, no accessory muscle use  RUE  Erythema of the volar/dorsal wrist and proximal forearm without TTP much improved from prior  pROM wrist : 0-15 extension, 0-5 flexion  no pain at terminal flexion/extension but stifness  AIN/PIN/U  SILT  [facility]      LABS:                        12.1   6.32  )-----------( 276      ( 07 Jun 2023 06:59 )             36.8     06-06    138  |  101  |  14  ----------------------------<  95  3.9   |  26  |  0.52    Ca    8.9      06 Jun 2023 11:25    TPro  7.3  /  Alb  3.8  /  TBili  0.5  /  DBili  x   /  AST  28  /  ALT  20  /  AlkPhos  67  06-06    I&O's Summary      MEDS:  MEDICATIONS  (STANDING):  cefTRIAXone   IVPB 1000 milliGRAM(s) IV Intermittent every 12 hours  venlafaxine 50 milliGRAM(s) Oral daily    MEDICATIONS  (PRN):

## 2023-06-07 NOTE — ED CDU PROVIDER SUBSEQUENT DAY NOTE - PROGRESS NOTE DETAILS
CDU NOTE ILENE Mendoza: pt resting comfortably, feels improved. NAD VSS. R wrist/forearm +mild erythema within previously drawn boundary and receding. FROM R wrist/hand/fingers. +mild swelling at R wrist/ thumb area.   pt seen by ortho-hand this morning- ok to d/c home on oral abx and to f/up outpatient with hand specialist.  pt seen by Dr. Garland Watkins, pt stable for d/c home

## 2023-06-07 NOTE — ED ADULT NURSE REASSESSMENT NOTE - NSFALLUNIVINTERV_ED_ALL_ED
Bed/Stretcher in lowest position, wheels locked, appropriate side rails in place/Call bell, personal items and telephone in reach/Instruct patient to call for assistance before getting out of bed/chair/stretcher/Non-slip footwear applied when patient is off stretcher/[place] to call system/Physically safe environment - no spills, clutter or unnecessary equipment/Purposeful proactive rounding/Room/bathroom lighting operational, light cord in reach

## 2023-06-09 LAB
-  AMIKACIN: SIGNIFICANT CHANGE UP
-  AZTREONAM: SIGNIFICANT CHANGE UP
-  CEFEPIME: SIGNIFICANT CHANGE UP
-  CEFTAZIDIME: SIGNIFICANT CHANGE UP
-  CIPROFLOXACIN: SIGNIFICANT CHANGE UP
-  GENTAMICIN: SIGNIFICANT CHANGE UP
-  IMIPENEM: SIGNIFICANT CHANGE UP
-  LEVOFLOXACIN: SIGNIFICANT CHANGE UP
-  MEROPENEM: SIGNIFICANT CHANGE UP
-  PIPERACILLIN/TAZOBACTAM: SIGNIFICANT CHANGE UP
-  TOBRAMYCIN: SIGNIFICANT CHANGE UP
CULTURE RESULTS: SIGNIFICANT CHANGE UP
METHOD TYPE: SIGNIFICANT CHANGE UP
ORGANISM # SPEC MICROSCOPIC CNT: SIGNIFICANT CHANGE UP
ORGANISM # SPEC MICROSCOPIC CNT: SIGNIFICANT CHANGE UP
SPECIMEN SOURCE: SIGNIFICANT CHANGE UP

## 2023-06-09 NOTE — ED POST DISCHARGE NOTE - RESULT SUMMARY
UCx PRELIM >100k pseudomonas, pt DC'ed on keflex and doxy for cellulitis and possible UTI which will not cover pseudomonas. Would await sentivities prior to contacting pt and assessing sxs to determine if need to treat UTI and if change in outpt abx vs need to return for IV abx. -Aldair Herring PA-C

## 2023-06-09 NOTE — ED POST DISCHARGE NOTE - DETAILS
ucx: >100,000 k pseudomonas. Called and spoke to pt. She currently has no symptoms. As per chart review pt had initially had urinary frequency and + UA. Will send rx for cipro and encouraged pt to folllow up with pcp within 2 days for further recommendations. Spoke to ID fellow who stated that there was no need to treat pt if she was asymptomatic at this time. Will treat pt as precaution until she can follow up with her pcp. Debby Douglass PA-C

## 2023-06-10 RX ORDER — CIPROFLOXACIN LACTATE 400MG/40ML
1 VIAL (ML) INTRAVENOUS
Qty: 20 | Refills: 0
Start: 2023-06-10 | End: 2023-06-19

## 2024-02-27 NOTE — ED CDU PROVIDER DISPOSITION NOTE - WR ORDER ID 3
DISPLAY PLAN FREE TEXT
[id number]
DISPLAY PLAN FREE TEXT

## 2025-03-06 NOTE — ED PROVIDER NOTE - NS_EDPROVIDERDISPOUSERTYPE_ED_A_ED
Fellow
Resident
Resident
Fellow
Resident
Fellow
Resident
Fellow
Resident
Attending Attestation (For Attendings USE Only)...
